# Patient Record
Sex: MALE | Race: WHITE | NOT HISPANIC OR LATINO | Employment: FULL TIME | URBAN - METROPOLITAN AREA
[De-identification: names, ages, dates, MRNs, and addresses within clinical notes are randomized per-mention and may not be internally consistent; named-entity substitution may affect disease eponyms.]

---

## 2020-01-06 DIAGNOSIS — L72.3 SEBACEOUS CYST: Primary | ICD-10-CM

## 2020-01-29 ENCOUNTER — OFFICE VISIT (OUTPATIENT)
Dept: SURGERY | Facility: CLINIC | Age: 70
End: 2020-01-29
Payer: MEDICARE

## 2020-01-29 VITALS — HEIGHT: 73 IN | WEIGHT: 268.81 LBS | BODY MASS INDEX: 35.63 KG/M2

## 2020-01-29 DIAGNOSIS — L08.9 INFECTED SEBACEOUS CYST: Primary | ICD-10-CM

## 2020-01-29 DIAGNOSIS — L72.3 INFECTED SEBACEOUS CYST: Primary | ICD-10-CM

## 2020-01-29 PROCEDURE — 99202 OFFICE O/P NEW SF 15 MIN: CPT | Mod: S$GLB,,, | Performed by: SURGERY

## 2020-01-29 PROCEDURE — 99202 PR OFFICE/OUTPT VISIT, NEW, LEVL II, 15-29 MIN: ICD-10-PCS | Mod: S$GLB,,, | Performed by: SURGERY

## 2020-01-29 PROCEDURE — 1159F MED LIST DOCD IN RCRD: CPT | Mod: S$GLB,,, | Performed by: SURGERY

## 2020-01-29 PROCEDURE — 1159F PR MEDICATION LIST DOCUMENTED IN MEDICAL RECORD: ICD-10-PCS | Mod: S$GLB,,, | Performed by: SURGERY

## 2020-01-29 RX ORDER — METOPROLOL SUCCINATE 50 MG/1
TABLET, EXTENDED RELEASE ORAL
COMMUNITY
Start: 2019-11-21

## 2020-01-29 RX ORDER — HYDROCHLOROTHIAZIDE 12.5 MG/1
TABLET ORAL
COMMUNITY
Start: 2019-11-04

## 2020-01-29 RX ORDER — MELOXICAM 15 MG/1
TABLET ORAL
COMMUNITY
Start: 2019-11-25

## 2020-01-29 RX ORDER — LOSARTAN POTASSIUM 100 MG/1
TABLET ORAL
COMMUNITY
Start: 2019-11-10

## 2020-01-29 RX ORDER — ALLOPURINOL 100 MG/1
TABLET ORAL
COMMUNITY
Start: 2019-11-04

## 2020-01-29 RX ORDER — LOVASTATIN 20 MG/1
TABLET ORAL
COMMUNITY
Start: 2019-12-29

## 2020-01-29 RX ORDER — DOXYCYCLINE 100 MG/1
CAPSULE ORAL
COMMUNITY
Start: 2020-01-20

## 2020-01-29 RX ORDER — METFORMIN HYDROCHLORIDE 500 MG/1
TABLET ORAL
COMMUNITY
Start: 2020-01-07

## 2020-01-29 NOTE — LETTER
January 29, 2020      Alexei Felder MD  [address] [facility] [address]           [facility] General Surgery  [address] [facility] [address]  Phone: [phone]  Fax: [phone]          Patient: Case Soler   MR Number: [id number]   YOB: 1950   Date of Visit: 1/29/2020       Dear Dr. Alexei Felder:    Thank you for referring Case Soler to me for evaluation. Attached you will find relevant portions of my assessment and plan of care.    If you have questions, please do not hesitate to call me. I look forward to following Case Soler along with you.    Sincerely,    Mario Peralta MD    Enclosure  CC:  No Recipients    If you would like to receive this communication electronically, please contact [email or web address] or [phone] to request more information on [email or web address] Link access.    For providers and/or their staff who would like to refer a patient to [facility], please contact us through our one-stop-shop provider referral line, [facility] , at [phone].    If you feel you have received this communication in error or would no longer like to receive these types of communications, please e-mail [email or web address]

## 2020-01-29 NOTE — PROGRESS NOTES
HPI:  69-year-old male with noninsulin dependent diabetes mellitus apparently had a large infected sebaceous cyst on his left upper back for the last couple weeks.  His primary care doc performed incision and drainage in the infection has resolved after this as well as course of p.o. antibiotics.  He is here today for me to have it checked.  He no longer has tenderness or pain in the region. He does have some clear type drainage from a small opening in the skin. His daughter showed me pictures of when the sebaceous cyst was abscess and infection was worse and it appears that the actual sebaceous cyst with was rather large probably around 3 cm in size    PHYSICAL EXAM:  Examination of the skin left upper back shows a 4 x 8 cm area of slight skin erythema with incision drainage site with cyst which is closed.  Underneath this is area of dead space which has serous fluid that when squeezed it does produce some serous fluid through a small opening in the skin. It is hard to tell exactly how big the actual cyst area is underneath the healed skin  ASSESSMENT:    Sebaceous cyst with abscess, status post incision drainage, resolving  PLAN:      At this point I do not feel that further incision and drainage is necessary.  I would let this heal further and hopefully we can define the area where the cyst was and perform excision.  If not then this will need to be reopened and the cyst wall scraped out in the wound probably packed because of the size that will not be able to get the skin edges back together.

## 2020-02-26 ENCOUNTER — OFFICE VISIT (OUTPATIENT)
Dept: SURGERY | Facility: CLINIC | Age: 70
End: 2020-02-26
Payer: MEDICARE

## 2020-02-26 DIAGNOSIS — L72.3 SEBACEOUS CYST: Primary | ICD-10-CM

## 2020-02-26 PROCEDURE — 99211 OFF/OP EST MAY X REQ PHY/QHP: CPT | Mod: S$GLB,,, | Performed by: SURGERY

## 2020-02-26 PROCEDURE — 99211 PR OFFICE/OUTPT VISIT, EST, LEVL I: ICD-10-PCS | Mod: S$GLB,,, | Performed by: SURGERY

## 2020-02-26 NOTE — PROGRESS NOTES
HPI:  Revisit for me to check an area on his left upper back where he had infected sebaceous cyst that I saw few weeks ago that was resolving while he was on antibiotics.  He returns today for me to check this area.  He is having no pain or drainage.    PHYSICAL EXAM:  Examination of left upper back still shows some erythema of the overlying skin but I do not feel a mass in the subcutaneous tissue in do not feel recurrent cyst at this time.  There is no tenderness.  ASSESSMENT:    Resolving sebaceous cyst left upper back  PLAN:   Told patient that if cyst recurs for to call the office before it becomes enlarged or infected so that I can see it and excise it in the office if possible.  At this time excision is not indicated because I really can't feel where the cyst is or was

## 2021-04-19 DIAGNOSIS — L72.3 SEBACEOUS CYST: Primary | ICD-10-CM

## 2021-04-26 ENCOUNTER — OFFICE VISIT (OUTPATIENT)
Dept: SURGERY | Facility: CLINIC | Age: 71
End: 2021-04-26
Payer: MEDICARE

## 2021-04-26 DIAGNOSIS — L72.3 SEBACEOUS CYST: Primary | ICD-10-CM

## 2021-04-26 PROCEDURE — 99212 PR OFFICE/OUTPT VISIT, EST, LEVL II, 10-19 MIN: ICD-10-PCS | Mod: 25,S$GLB,, | Performed by: SURGERY

## 2021-04-26 PROCEDURE — 11403: ICD-10-PCS | Mod: S$GLB,,, | Performed by: SURGERY

## 2021-04-26 PROCEDURE — 11403 EXC TR-EXT B9+MARG 2.1-3CM: CPT | Mod: S$GLB,,, | Performed by: SURGERY

## 2021-04-26 PROCEDURE — 99212 OFFICE O/P EST SF 10 MIN: CPT | Mod: 25,S$GLB,, | Performed by: SURGERY

## 2021-05-03 ENCOUNTER — OFFICE VISIT (OUTPATIENT)
Dept: SURGERY | Facility: CLINIC | Age: 71
End: 2021-05-03
Payer: MEDICARE

## 2021-05-03 DIAGNOSIS — Z98.890 POST-OPERATIVE STATE: Primary | ICD-10-CM

## 2021-05-03 PROCEDURE — 99024 PR POST-OP FOLLOW-UP VISIT: ICD-10-PCS | Mod: S$GLB,POP,, | Performed by: SURGERY

## 2021-05-03 PROCEDURE — 99024 POSTOP FOLLOW-UP VISIT: CPT | Mod: S$GLB,POP,, | Performed by: SURGERY

## 2021-07-01 ENCOUNTER — PATIENT MESSAGE (OUTPATIENT)
Dept: ADMINISTRATIVE | Facility: OTHER | Age: 71
End: 2021-07-01